# Patient Record
(demographics unavailable — no encounter records)

---

## 2024-11-26 NOTE — HISTORY OF PRESENT ILLNESS
[de-identified] : Ms. BRYAN JOVEL is a 70-year-old woman, referred by Dr. Dorian Zhang for consultation regarding a possible neoplasm, here for an initial visit.  Bryan presented to [facility] in 2024 w/ complaints of 1 week of progressively worsening abdominal pain, more severe to her RLQ pain. Subsequent CT showed multiple findings concerning for perforated appendicitis vs neoplastic process. She was ultimately discharged home on 2 weeks of Augmentin followed by a repeat CT to further assess.   CT A/P 2024 - small right & trace left pleural effusions, bibasilar subsegmental atelectasis  - irregular mural thickening & hyperenhancement involving the cecum & terminal ileum - suspicious for neoplasm, which measures approx. 5.5 x 3.8 cm - there is a complex multiloculated cystic structure or collection within the RLQ immediately adjacent to the cecum that measures 5.7 x 5.0 cm w/ components that appear somewhat tubular - possibly reflecting a dilated appendix, otherwise appendix is not clearly delineated - diffuse fat infiltration in the pelvis - wall thickening of the rectosigmoid colon, a segment of which is inseparable from the aforementioned complex cystic structure, colonic diverticulosis   labs 2024 - CEA, CA 19.9,  & LFTs all WNL  CT chest (non con) 2024 - no pulm nodules noted - small B/L pleural effusions, R > L   pelvic US 2024 - B/L ovarian cysts - mild thickening of the endometrial complex, which measures 8 mm - Right adnexal extraovarian mass measuring up to 5.3 cm corresponding w./ findings on recent CT - nonspecific but may represent perforated appendicitis   pelvic MRI 2024 - 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 Menopause 53.  ( births, ). Age of first full-term pregnancy 18. Denies OCP/HRT use. NO prior Cscope  CT A/P 2024 - unchanged sub cm hypodense hepatic lesions that are too small to characterize - few sub cm hypodense left renal lesions that are too small to characterize, Right kidney slightly rotated on its axis as on prior imaging  - B/L multiseptated cystic adnexal masses are again noted, measuring 3.7 x 2.4 cm on the Right and 5.1 x 2.8 cm on the Left - minimally smaller but not significantly changed respectively  - small duodenal diverticulum, large amt of stool throughout colon - irregular annular thickening & hyperenhancement of cecal bowel wall, most c/w a cecal mass - interval decrease in size of a multi loculated collection in the RLQ encompassing a thickened & abnormal appearing appendix  - a more superior component of the collection measures approx. 2.3 x 1.7 cm (previously 4.5 x 3.7 cm) and a more inferior component measures approx. 4.2 x 3.6 cm (previously 6.9 x 5.4 cm) - partially necrotic RLQ soft tissue/LAD w/ a referenced LN measuring 3.1 x 2.0 cm - an aortocaval LN measures 1.5 x 1.8 cm, unchanged, additional aortocaval LN 1.8 x 1.1 cm is unchanged, previously referenced Right mesenteric LN measuring 2.5 x 1.2 cm (previously 2.1 x 1.3 cm)  2024 - Bryan reports being in her usual state of health until earlier November when she started having generalized abdominal pain that progressively worsened, eventually focusing mainly in her RLQ. She called her PCP who directed her to urgent care, at  an abd US showed negative findings. Given the severity of her pain, she then reported to [facility] for further workup. She is almost done with a 14-day course of Augmentin (about 3 days left), no longer has any abdominal pain. She denies any nausea/vomiting, diarrhea, appetite & weight are unchanged. She is currently in between PCPs as her previous PCP (Dr. Davis @ [facility]) no longer does internal medicine, has not seen established with a new PCP yet.

## 2024-11-26 NOTE — HISTORY OF PRESENT ILLNESS
[de-identified] : Ms. BRYAN JOVEL is a 70-year-old woman, referred by Dr. Dorian Zhang for consultation regarding a possible neoplasm, here for an initial visit.  Bryan presented to [facility] in 2024 w/ complaints of 1 week of progressively worsening abdominal pain, more severe to her RLQ pain. Subsequent CT showed multiple findings concerning for perforated appendicitis vs neoplastic process. She was ultimately discharged home on 2 weeks of Augmentin followed by a repeat CT to further assess.   CT A/P 2024 - small right & trace left pleural effusions, bibasilar subsegmental atelectasis  - irregular mural thickening & hyperenhancement involving the cecum & terminal ileum - suspicious for neoplasm, which measures approx. 5.5 x 3.8 cm - there is a complex multiloculated cystic structure or collection within the RLQ immediately adjacent to the cecum that measures 5.7 x 5.0 cm w/ components that appear somewhat tubular - possibly reflecting a dilated appendix, otherwise appendix is not clearly delineated - diffuse fat infiltration in the pelvis - wall thickening of the rectosigmoid colon, a segment of which is inseparable from the aforementioned complex cystic structure, colonic diverticulosis   labs 2024 - CEA, CA 19.9,  & LFTs all WNL  CT chest (non con) 2024 - no pulm nodules noted - small B/L pleural effusions, R > L   pelvic US 2024 - B/L ovarian cysts - mild thickening of the endometrial complex, which measures 8 mm - Right adnexal extraovarian mass measuring up to 5.3 cm corresponding w./ findings on recent CT - nonspecific but may represent perforated appendicitis   pelvic MRI 2024 - 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 Menopause 53.  ( births, ). Age of first full-term pregnancy 18. Denies OCP/HRT use. NO prior Cscope  CT A/P 2024 - unchanged sub cm hypodense hepatic lesions that are too small to characterize - few sub cm hypodense left renal lesions that are too small to characterize, Right kidney slightly rotated on its axis as on prior imaging  - B/L multiseptated cystic adnexal masses are again noted, measuring 3.7 x 2.4 cm on the Right and 5.1 x 2.8 cm on the Left - minimally smaller but not significantly changed respectively  - small duodenal diverticulum, large amt of stool throughout colon - irregular annular thickening & hyperenhancement of cecal bowel wall, most c/w a cecal mass - interval decrease in size of a multi loculated collection in the RLQ encompassing a thickened & abnormal appearing appendix  - a more superior component of the collection measures approx. 2.3 x 1.7 cm (previously 4.5 x 3.7 cm) and a more inferior component measures approx. 4.2 x 3.6 cm (previously 6.9 x 5.4 cm) - partially necrotic RLQ soft tissue/LAD w/ a referenced LN measuring 3.1 x 2.0 cm - an aortocaval LN measures 1.5 x 1.8 cm, unchanged, additional aortocaval LN 1.8 x 1.1 cm is unchanged, previously referenced Right mesenteric LN measuring 2.5 x 1.2 cm (previously 2.1 x 1.3 cm)  2024 - Bryan reports being in her usual state of health until earlier November when she started having generalized abdominal pain that progressively worsened, eventually focusing mainly in her RLQ. She called her PCP who directed her to urgent care, at  an abd US showed negative findings. Given the severity of her pain, she then reported to [facility] for further workup. She is almost done with a 14-day course of Augmentin (about 3 days left), no longer has any abdominal pain. She denies any nausea/vomiting, diarrhea, appetite & weight are unchanged. She is currently in between PCPs as her previous PCP (Dr. Davis @ [facility]) no longer does internal medicine, has not seen established with a new PCP yet.

## 2024-11-26 NOTE — CONSULT LETTER
[Dear  ___] : Dear  [unfilled], [Consult Letter:] : I had the pleasure of evaluating your patient, [unfilled]. [Please see my note below.] : Please see my note below. [Consult Closing:] : Thank you very much for allowing me to participate in the care of this patient.  If you have any questions, please do not hesitate to contact me. [Sincerely,] : Sincerely, [FreeTextEntry2] : Jarred Davis MD [FreeTextEntry3] : Bull Montalvo MD Surgical Oncology [facility] Office: [phone] Cell: [phone]

## 2024-11-26 NOTE — ASSESSMENT
[FreeTextEntry1] : We discussed the perforated appendicitis and the concern for a cecal malignancy, and the adnexal mass.  The appendiceal abscess appears to have decreased in size over the past 2 weeks with antibiotics.  The cecal mass persists as does the adnexal lesions.  We discussed the need for ongoing antibiotics for another 2 weeks and repeat imaging including a CT colonography.  We will also arrange for GYN oncology follow-up and GI evaluation as well.  Ultimately, we discussed the need for surgical extirpation of the cecal mass pending the further workup.   All medical entries were at my, Dr. Bull Montalvo, direction. I have reviewed the chart and agree that the record accurately reflects my personal performance of the history, physical exam, assessment, and plan.  Our office nurse practitioner was present for the duration of the office visit.

## 2024-11-26 NOTE — CONSULT LETTER
[Dear  ___] : Dear  [unfilled], [Consult Letter:] : I had the pleasure of evaluating your patient, [unfilled]. [Please see my note below.] : Please see my note below. [Consult Closing:] : Thank you very much for allowing me to participate in the care of this patient.  If you have any questions, please do not hesitate to contact me. [Sincerely,] : Sincerely, [FreeTextEntry2] : Jarred Davis MD [FreeTextEntry3] : Bull Montalvo MD Surgical Oncology [facility] Office: [phone] Cell: [phone]

## 2024-12-06 NOTE — PROCEDURE
[Endometrial Biopsy] : an endometrial biopsy [Thickened Endometrium] : thickened endometrium [Patient] : the patient [Written consent] : written consent was obtained prior to the procedure and is detailed in the patient's record [Site Verification] : site verification performed. [Betadine] : betadine [Yes] : the specimen was sent to pathology [No Complications] : none [Tolerated Well] : the patient tolerated the procedure well [FreeTextEntry1] : minimal tissue with 2 passes of pipelle

## 2024-12-06 NOTE — HISTORY OF PRESENT ILLNESS
[FreeTextEntry1] : Ms. BRYAN JOVEL is a 70-year-old woman, referred by Dr. Montalvo for consultation regarding Ovarian cysts.  Of note, she has a known hx of endometriosis, s/p laparoscopy many years ago.    Bryan presented to ED at [facility] in November w/ complaints of 1 week of progressively worsening abdominal pain, more severe to her RLQ pain. Subsequent CT showed multiple findings concerning for perforated appendicitis vs neoplastic process. She was ultimately discharged home on 2 weeks of Augmentin followed by a repeat CT to further assess.  CT A/P 11/11/2024 - small right & trace left pleural effusions, bibasilar subsegmental atelectasis - irregular mural thickening & hyperenhancement involving the cecum & terminal ileum - suspicious for neoplasm, which measures approx. 5.5 x 3.8 cm - there is a complex multiloculated cystic structure or collection within the RLQ immediately adjacent to the cecum that measures 5.7 x 5.0 cm w/ components that appear somewhat tubular - possibly reflecting a dilated appendix, otherwise appendix is not clearly delineated - diffuse fat infiltration in the pelvis - wall thickening of the rectosigmoid colon, a segment of which is inseparable from the aforementioned complex cystic structure, colonic diverticulosis  labs 11/11/2024 - CEA, CA 19.9,  & LFTs all WNL - 33  CT chest (non con) 11/12/2024 - no pulm nodules noted - small B/L pleural effusions, R > L  pelvic US 11/12/2024 - B/L ovarian cysts - mild thickening of the endometrial complex, which measures 8 mm - Right adnexal extraovarian mass measuring up to 5.3 cm corresponding w./ findings on recent CT - nonspecific but may represent perforated appendicitis  pelvic MRI 11/13/2024 - redemonstrated 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 cm) w/ adjacent LAD - RLQ abscess (7.5 x 6.6 cm) likely due to perforated appendix, focal thickening of the sigmoid colon abutting the collection - multi cystic appearance of the ovaries  CT A/P 11/22/2024 - unchanged sub cm hypodense hepatic lesions that are too small to characterize - few sub cm hypodense left renal lesions that are too small to characterize, Right kidney slightly rotated on its axis as on prior imaging - B/L multiseptated cystic adnexal masses are again noted, measuring 3.7 x 2.4 cm on the Right and 5.1 x 2.8 cm on the Left - minimally smaller but not significantly changed respectively - small duodenal diverticulum, large amt of stool throughout colon - irregular annular thickening & hyperenhancement of cecal bowel wall, most c/w a cecal mass - interval decrease in size of a multi loculated collection in the RLQ encompassing a thickened & abnormal appearing appendix - a more superior component of the collection measures approx. 2.3 x 1.7 cm (previously 4.5 x 3.7 cm) and a more inferior component measures approx. 4.2 x 3.6 cm (previously 6.9 x 5.4 cm) - partially necrotic RLQ soft tissue/LAD w/ a referenced LN measuring 3.1 x 2.0 cm - an aortocaval LN measures 1.5 x 1.8 cm, unchanged, additional aortocaval LN 1.8 x 1.1 cm is unchanged, previously referenced Right mesenteric LN measuring 2.5 x 1.2 cm (previously 2.1 x 1.3 cm)  Plan for repeat CT scan on Monday 12/9/2024.  She has intermittent RLQ pain, Overall she is feeling better.  She denies dyspnea or chest pain, vaginal bleeding or discharge, nausea/vomiting, changes in bowel habits or urination, lower extremity edema or pain.  She is here to discuss further management.   HM- she has not seen a gynecologist, or had a colonoscopy in many years.

## 2024-12-06 NOTE — PAST MEDICAL HISTORY
[Postmenopausal] : The patient is postmenopausal [Menarche Age ____] : age at menarche was [unfilled] [Menopause Age____] : age at menopause was [unfilled] [Total Preg ___] : G[unfilled] [Live Births ___] : P[unfilled]  [Full Term ___] : Full Term: [unfilled] [Premature ___] : Premature: [unfilled] [Abortions ___] : Abortions:[unfilled] [Living ___] : Living: [unfilled]

## 2024-12-06 NOTE — OB HISTORY
[Total Preg ___] : : [unfilled] [Premature ___] : [unfilled] (premature) [Abortions ___] : [unfilled] (abortions) [Living ___] : [unfilled] (living) [Vaginal ___] : [unfilled] vaginal delivery(s) [Menarche Age ____] : age at menarche was [unfilled] [Menopause  Age ____] : menopause occurred at age [unfilled]

## 2024-12-06 NOTE — PHYSICAL EXAM
[Chaperone Present] : A chaperone was present in the examining room during all aspects of the physical examination [67991] : A chaperone was present during the pelvic exam. [Normal] : Anus and perineum: Normal sphincter tone, no masses, no prolapse.

## 2024-12-06 NOTE — DISCUSSION/SUMMARY
[FreeTextEntry1] : 71 yo with bilateral adnexal cysts, normal  and thickened endometrium  I discussed my recommendations with Ms. Novak in detail.    The management of bilateral adnexal cysts in postmenopausal state was discussed.  Ovarian malignancy can only be diagnosed definitively after excision.  Based on normal CA-125 and radiographic appearance of cysts, the cysts are likely benign.  Options for management include continued surveillance vs consideration of surgical excision.  Risks/benefits of each option discussed in detail.  As she is planned to have surgery for ruptured appendicitis, I would recommend BSO at time of surgery.  I also discussed the management of asymptomatic thickened endometrium.  Endometrial biopsy was performed today with scant tissue.  Pending results, plan is for continued surveillance vs consideration of hysterectomy at time of surgery.  Risks/benefits of each option in this situation also discussed.  I will contact patient with results of biopsy when available.  She is scheduled for f/u CT next week.  Further management will be coordinated with Dr. Montalvo.

## 2024-12-23 NOTE — HISTORY OF PRESENT ILLNESS
[de-identified] : Ms. BRYAN JOVEL is a 70-year-old woman, referred by Dr. Dorian Zhang for consultation regarding a possible neoplasm, here for a follow-up visit.   Bryan presented to [facility] in 2024 w/ complaints of 1 week of progressively worsening abdominal pain, more severe to her RLQ pain. Subsequent CT showed multiple findings concerning for perforated appendicitis vs neoplastic process. She was ultimately discharged home on 2 weeks of Augmentin followed by a repeat CT to further assess.   CT A/P 2024 - small right & trace left pleural effusions, bibasilar subsegmental atelectasis  - irregular mural thickening & hyperenhancement involving the cecum & terminal ileum - suspicious for neoplasm, which measures approx. 5.5 x 3.8 cm - there is a complex multiloculated cystic structure or collection within the RLQ immediately adjacent to the cecum that measures 5.7 x 5.0 cm w/ components that appear somewhat tubular - possibly reflecting a dilated appendix, otherwise appendix is not clearly delineated - diffuse fat infiltration in the pelvis - wall thickening of the rectosigmoid colon, a segment of which is inseparable from the aforementioned complex cystic structure, colonic diverticulosis   labs 2024 - CEA, CA 19.9,  & LFTs all WNL  CT chest (non con) 2024 - no pulm nodules noted - small B/L pleural effusions, R > L   pelvic US 2024 - B/L ovarian cysts - mild thickening of the endometrial complex, which measures 8 mm - Right adnexal extraovarian mass measuring up to 5.3 cm corresponding w./ findings on recent CT - nonspecific but may represent perforated appendicitis   pelvic MRI 2024 - 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 Menopause 53.  ( births, ). Age of first full-term pregnancy 18. Denies OCP/HRT use. NO prior Cscope  CT A/P 2024 - unchanged sub cm hypodense hepatic lesions that are too small to characterize - few sub cm hypodense left renal lesions that are too small to characterize, Right kidney slightly rotated on its axis as on prior imaging  - B/L multiseptated cystic adnexal masses are again noted, measuring 3.7 x 2.4 cm on the Right and 5.1 x 2.8 cm on the Left - minimally smaller but not significantly changed respectively  - small duodenal diverticulum, large amt of stool throughout colon - irregular annular thickening & hyperenhancement of cecal bowel wall, most c/w a cecal mass - interval decrease in size of a multi loculated collection in the RLQ encompassing a thickened & abnormal appearing appendix  - a more superior component of the collection measures approx. 2.3 x 1.7 cm (previously 4.5 x 3.7 cm) and a more inferior component measures approx. 4.2 x 3.6 cm (previously 6.9 x 5.4 cm) - partially necrotic RLQ soft tissue/LAD w/ a referenced LN measuring 3.1 x 2.0 cm - an aortocaval LN measures 1.5 x 1.8 cm, unchanged, additional aortocaval LN 1.8 x 1.1 cm is unchanged, previously referenced Right mesenteric LN measuring 2.5 x 1.2 cm (previously 2.1 x 1.3 cm)  2024 - Bryan reports being in her usual state of health until earlier November when she started having generalized abdominal pain that progressively worsened, eventually focusing mainly in her RLQ. She called her PCP who directed her to urgent care, at  an abd US showed negative findings. Given the severity of her pain, she then reported to [facility] for further workup. She is almost done with a 14-day course of Augmentin (about 3 days left), no longer has any abdominal pain. She denies any nausea/vomiting, diarrhea, appetite & weight are unchanged. She is currently in between PCPs as her previous PCP (Dr. Davis @ [facility]) no longer does internal medicine, has not seen established with a new PCP yet.  We discussed the perforated appendicitis and the concern for a cecal malignancy, and the adnexal mass.  The appendiceal abscess appears to have decreased in size over the past 2 weeks with antibiotics.  The cecal mass persists as does the adnexal lesions.  We discussed the need for ongoing antibiotics for another 2 weeks and repeat imaging including a CT colonography.  We will also arrange for GYN oncology follow-up and GI evaluation as well.  Ultimately, we discussed the need for surgical extirpation of the cecal mass pending the further workup.  CT enterography 2024 - 4.8 cm septated Left adnexal cystic mass & 3.5 cm Right adnexal cystic mass are unchanged, endometrium is mildly fluid distended vs thickened - masslike annular thickening of the cecum w/ avid enhancement is similar to prior study, colonic diverticulosis. appendix remains thickened & abnormal  * resolution of previously noted pelvic fluid collections - RP nodes, some now necrotic. referenced necrotic aortocaval node measures 2.3 x 1.5 cm (previously 1.8 x 1.1 cm) adjacent nonnecrotic node measures 1.6 x 1.1 cm, w/o significant change when remeasured on prior study, RLQ mesenteric heterogenous mass/adenopathy measures 2.7 x 1.9 cm (previously 3.1 x 2.0 cm)  2024 - Bryan is here for a follow-up visit after her recent imaging to discuss next steps. She consulted w/ Dr. Ilia Llanes last week (GI @ [facility]) and he is planning for a baseline preoperative Cscope. She has finished her course of ABT yesterday. She consulted w/ Dr. Yamile Modi from [facility] who does recommend a concomitant BSO at the time of our surgery - Bryan defers. She otherwise is feeling better, noted some right intermittent right flank pain that is mild in nature.

## 2024-12-23 NOTE — CONSULT LETTER
[Dear  ___] : Dear  [unfilled], [Consult Letter:] : I had the pleasure of evaluating your patient, [unfilled]. [Please see my note below.] : Please see my note below. [Consult Closing:] : Thank you very much for allowing me to participate in the care of this patient.  If you have any questions, please do not hesitate to contact me. [Sincerely,] : Sincerely, [DrRasheeda  ___] : Dr. MARTINEZ [DrRasheeda ___] : Dr. MARTINEZ [FreeTextEntry2] : Jarred Davis MD [FreeTextEntry3] : Bull Montalvo MD Surgical Oncology [facility] Office: [phone] Cell: [phone]  Advancement Flap (Single) Text: The defect edges were debeveled with a #15 scalpel blade.  Given the location of the defect and the proximity to free margins a single advancement flap was deemed most appropriate.  Using a sterile surgical marker, an appropriate advancement flap was drawn incorporating the defect and placing the expected incisions within the relaxed skin tension lines where possible.    The area thus outlined was incised deep to adipose tissue with a #15 scalpel blade.  The skin margins were undermined to an appropriate distance in all directions utilizing iris scissors.

## 2024-12-23 NOTE — HISTORY OF PRESENT ILLNESS
[de-identified] : Ms. BRYAN JOVEL is a 70-year-old woman, referred by Dr. Dorian Zhang for consultation regarding a possible neoplasm, here for a follow-up visit.   Bryan presented to [facility] in 2024 w/ complaints of 1 week of progressively worsening abdominal pain, more severe to her RLQ pain. Subsequent CT showed multiple findings concerning for perforated appendicitis vs neoplastic process. She was ultimately discharged home on 2 weeks of Augmentin followed by a repeat CT to further assess.   CT A/P 2024 - small right & trace left pleural effusions, bibasilar subsegmental atelectasis  - irregular mural thickening & hyperenhancement involving the cecum & terminal ileum - suspicious for neoplasm, which measures approx. 5.5 x 3.8 cm - there is a complex multiloculated cystic structure or collection within the RLQ immediately adjacent to the cecum that measures 5.7 x 5.0 cm w/ components that appear somewhat tubular - possibly reflecting a dilated appendix, otherwise appendix is not clearly delineated - diffuse fat infiltration in the pelvis - wall thickening of the rectosigmoid colon, a segment of which is inseparable from the aforementioned complex cystic structure, colonic diverticulosis   labs 2024 - CEA, CA 19.9,  & LFTs all WNL  CT chest (non con) 2024 - no pulm nodules noted - small B/L pleural effusions, R > L   pelvic US 2024 - B/L ovarian cysts - mild thickening of the endometrial complex, which measures 8 mm - Right adnexal extraovarian mass measuring up to 5.3 cm corresponding w./ findings on recent CT - nonspecific but may represent perforated appendicitis   pelvic MRI 2024 - redemonstrated 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 cm) w/ adjacent LAD - RLQ abscess (7.5 x 6.6 cm) likely due to perforated appendix, focal thickening of the sigmoid colon abutting the collection - multi cystic appearance of the ovaries   PMH: denies    PSH: laparoscopic sx  for endometriosis  Meds:  denies ALL:  NKDA, seasonal  SH:  denies tobacco use, social ETOH, lives at home w/ her  (Víctor), 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 Menopause 53.  ( births, ). Age of first full-term pregnancy 18. Denies OCP/HRT use. NO prior Cscope  CT A/P 2024 - unchanged sub cm hypodense hepatic lesions that are too small to characterize - few sub cm hypodense left renal lesions that are too small to characterize, Right kidney slightly rotated on its axis as on prior imaging  - B/L multiseptated cystic adnexal masses are again noted, measuring 3.7 x 2.4 cm on the Right and 5.1 x 2.8 cm on the Left - minimally smaller but not significantly changed respectively  - small duodenal diverticulum, large amt of stool throughout colon - irregular annular thickening & hyperenhancement of cecal bowel wall, most c/w a cecal mass - interval decrease in size of a multi loculated collection in the RLQ encompassing a thickened & abnormal appearing appendix  - a more superior component of the collection measures approx. 2.3 x 1.7 cm (previously 4.5 x 3.7 cm) and a more inferior component measures approx. 4.2 x 3.6 cm (previously 6.9 x 5.4 cm) - partially necrotic RLQ soft tissue/LAD w/ a referenced LN measuring 3.1 x 2.0 cm - an aortocaval LN measures 1.5 x 1.8 cm, unchanged, additional aortocaval LN 1.8 x 1.1 cm is unchanged, previously referenced Right mesenteric LN measuring 2.5 x 1.2 cm (previously 2.1 x 1.3 cm)  2024 - Bryan reports being in her usual state of health until earlier November when she started having generalized abdominal pain that progressively worsened, eventually focusing mainly in her RLQ. She called her PCP who directed her to urgent care, at  an abd US showed negative findings. Given the severity of her pain, she then reported to [facility] for further workup. She is almost done with a 14-day course of Augmentin (about 3 days left), no longer has any abdominal pain. She denies any nausea/vomiting, diarrhea, appetite & weight are unchanged. She is currently in between PCPs as her previous PCP (Dr. Davis @ [facility]) no longer does internal medicine, has not seen established with a new PCP yet.  We discussed the perforated appendicitis and the concern for a cecal malignancy, and the adnexal mass.  The appendiceal abscess appears to have decreased in size over the past 2 weeks with antibiotics.  The cecal mass persists as does the adnexal lesions.  We discussed the need for ongoing antibiotics for another 2 weeks and repeat imaging including a CT colonography.  We will also arrange for GYN oncology follow-up and GI evaluation as well.  Ultimately, we discussed the need for surgical extirpation of the cecal mass pending the further workup.  CT enterography 2024 - 4.8 cm septated Left adnexal cystic mass & 3.5 cm Right adnexal cystic mass are unchanged, endometrium is mildly fluid distended vs thickened - masslike annular thickening of the cecum w/ avid enhancement is similar to prior study, colonic diverticulosis. appendix remains thickened & abnormal  * resolution of previously noted pelvic fluid collections - RP nodes, some now necrotic. referenced necrotic aortocaval node measures 2.3 x 1.5 cm (previously 1.8 x 1.1 cm) adjacent nonnecrotic node measures 1.6 x 1.1 cm, w/o significant change when remeasured on prior study, RLQ mesenteric heterogenous mass/adenopathy measures 2.7 x 1.9 cm (previously 3.1 x 2.0 cm)  2024 - Bryan is here for a follow-up visit after her recent imaging to discuss next steps. She consulted w/ Dr. Ilia Llanes last week (GI @ [facility]) and he is planning for a baseline preoperative Cscope. She has finished her course of ABT yesterday. She consulted w/ Dr. Yamile Modi from [facility] who does recommend a concomitant BSO at the time of our surgery - Bryan defers. She otherwise is feeling better, noted some right intermittent right flank pain that is mild in nature.

## 2024-12-23 NOTE — ASSESSMENT
[FreeTextEntry1] :    All medical entries were at my, Dr. Bull Montalvo, direction. I have reviewed the chart and agree that the record accurately reflects my personal performance of the history, physical exam, assessment, and plan.  Our office nurse practitioner was present for the duration of the office visit.

## 2024-12-23 NOTE — CONSULT LETTER
[Dear  ___] : Dear  [unfilled], [Consult Letter:] : I had the pleasure of evaluating your patient, [unfilled]. [Please see my note below.] : Please see my note below. [Consult Closing:] : Thank you very much for allowing me to participate in the care of this patient.  If you have any questions, please do not hesitate to contact me. [Sincerely,] : Sincerely, [DrRasheeda  ___] : Dr. MARTINEZ [DrRasheeda ___] : Dr. MARTINEZ [FreeTextEntry2] : Jarred Davis MD [FreeTextEntry3] : Bull Montalvo MD Surgical Oncology [facility] Office: [phone] Cell: [phone]

## 2025-02-28 NOTE — CONSULT LETTER
[Dear  ___] : Dear  [unfilled], [Consult Letter:] : I had the pleasure of evaluating your patient, [unfilled]. [Please see my note below.] : Please see my note below. [Consult Closing:] : Thank you very much for allowing me to participate in the care of this patient.  If you have any questions, please do not hesitate to contact me. [Sincerely,] : Sincerely, [DrRasheeda  ___] : Dr. MARTINEZ [DrRasheeda ___] : Dr. MARTINEZ [FreeTextEntry2] : Jarred Davis MD [FreeTextEntry3] : Bull Montalvo MD Surgical Oncology [facility] Office: [phone] Cell: [phone]

## 2025-02-28 NOTE — PHYSICAL EXAM
[Normal] : supple, no neck mass and thyroid not enlarged [Normal Neck Lymph Nodes] : normal neck lymph nodes  [Normal Supraclavicular Lymph Nodes] : normal supraclavicular lymph nodes [Normal Groin Lymph Nodes] : normal groin lymph nodes [Normal Axillary Lymph Nodes] : normal axillary lymph nodes [Normal] : oriented to person, place and time, with appropriate affect [de-identified] : midline incision healing well

## 2025-02-28 NOTE — HISTORY OF PRESENT ILLNESS
[de-identified] : Ms. BRYAN JOVEL is a 70-year-old woman, referred by Dr. 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   Bryan presented to [facility] in 2024 w/ complaints of 1 week of progressively worsening abdominal pain, more severe to her RLQ pain. Subsequent CT showed multiple findings concerning for perforated appendicitis vs neoplastic process. She was ultimately discharged home on 2 weeks of Augmentin followed by a repeat CT to further assess.   CT A/P 2024 - small right & trace left pleural effusions, bibasilar subsegmental atelectasis  - irregular mural thickening & hyperenhancement involving the cecum & terminal ileum - suspicious for neoplasm, which measures approx. 5.5 x 3.8 cm - there is a complex multiloculated cystic structure or collection within the RLQ immediately adjacent to the cecum that measures 5.7 x 5.0 cm w/ components that appear somewhat tubular - possibly reflecting a dilated appendix, otherwise appendix is not clearly delineated - diffuse fat infiltration in the pelvis - wall thickening of the rectosigmoid colon, a segment of which is inseparable from the aforementioned complex cystic structure, colonic diverticulosis   labs 2024 - CEA, CA 19.9,  & LFTs all WNL  CT chest (non con) 2024 - no pulm nodules noted - small B/L pleural effusions, R > L   pelvic US 2024 - B/L ovarian cysts - mild thickening of the endometrial complex, which measures 8 mm - Right adnexal extraovarian mass measuring up to 5.3 cm corresponding w./ findings on recent CT - nonspecific but may represent perforated appendicitis   pelvic MRI 2024 - redemonstrated 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 cm) w/ adjacent LAD - RLQ abscess (7.5 x 6.6 cm) likely due to perforated appendix, focal thickening of the sigmoid colon abutting the collection - multi cystic appearance of the ovaries   PMH: denies    PSH: laparoscopic sx  for endometriosis  Meds:  denies ALL:  NKDA, seasonal  SH:  denies tobacco use, social ETOH, lives at home w/ her  (Víctor), 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 Menopause 53.  ( births, ). Age of first full-term pregnancy 18. Denies OCP/HRT use. NO prior Cscope  CT A/P 2024 - unchanged sub cm hypodense hepatic lesions that are too small to characterize - few sub cm hypodense left renal lesions that are too small to characterize, Right kidney slightly rotated on its axis as on prior imaging  - B/L multiseptated cystic adnexal masses are again noted, measuring 3.7 x 2.4 cm on the Right and 5.1 x 2.8 cm on the Left - minimally smaller but not significantly changed respectively  - small duodenal diverticulum, large amt of stool throughout colon - irregular annular thickening & hyperenhancement of cecal bowel wall, most c/w a cecal mass - interval decrease in size of a multi loculated collection in the RLQ encompassing a thickened & abnormal appearing appendix  - a more superior component of the collection measures approx. 2.3 x 1.7 cm (previously 4.5 x 3.7 cm) and a more inferior component measures approx. 4.2 x 3.6 cm (previously 6.9 x 5.4 cm) - partially necrotic RLQ soft tissue/LAD w/ a referenced LN measuring 3.1 x 2.0 cm - an aortocaval LN measures 1.5 x 1.8 cm, unchanged, additional aortocaval LN 1.8 x 1.1 cm is unchanged, previously referenced Right mesenteric LN measuring 2.5 x 1.2 cm (previously 2.1 x 1.3 cm)  2024 - Bryan reports being in her usual state of health until earlier November when she started having generalized abdominal pain that progressively worsened, eventually focusing mainly in her RLQ. She called her PCP who directed her to urgent care, at  an abd US showed negative findings. Given the severity of her pain, she then reported to [facility] for further workup. She is almost done with a 14-day course of Augmentin (about 3 days left), no longer has any abdominal pain. She denies any nausea/vomiting, diarrhea, appetite & weight are unchanged. She is currently in between PCPs as her previous PCP (Dr. Davis @ [facility] no longer does internal medicine, has not seen established with a new PCP yet.  We discussed the perforated appendicitis and the concern for a cecal malignancy, and the adnexal mass.  The appendiceal abscess appears to have decreased in size over the past 2 weeks with antibiotics.  The cecal mass persists as does the adnexal lesions.  We discussed the need for ongoing antibiotics for another 2 weeks and repeat imaging including a CT colonography.  We will also arrange for GYN oncology follow-up and GI evaluation as well.  Ultimately, we discussed the need for surgical extirpation of the cecal mass pending the further workup.  CT enterography 2024 - 4.8 cm septated Left adnexal cystic mass & 3.5 cm Right adnexal cystic mass are unchanged, endometrium is mildly fluid distended vs thickened - masslike annular thickening of the cecum w/ avid enhancement is similar to prior study, colonic diverticulosis. appendix remains thickened & abnormal  * resolution of previously noted pelvic fluid collections - RP nodes, some now necrotic. referenced necrotic aortocaval node measures 2.3 x 1.5 cm (previously 1.8 x 1.1 cm) adjacent nonnecrotic node measures 1.6 x 1.1 cm, w/o significant change when remeasured on prior study, RLQ mesenteric heterogenous mass/adenopathy measures 2.7 x 1.9 cm (previously 3.1 x 2.0 cm)  2024 - Bryan is here for a follow-up visit after her recent imaging to discuss next steps. She consulted w/ Dr. Ilia Llanes last week (GI @ [facility]) and he is planning for a baseline preoperative Cscope. She has finished her course of ABT yesterday. She consulted w/ Dr. Yamile Modi from [facility] who does recommend a concomitant BSO at the time of our surgery - Bryan defers. She otherwise is feeling better, noted some right intermittent right flank pain that is mild in nature.  We discussed the plan for a robotic possible open right colectomy including the jazzy-appendiceal abscess. We discussed the risks, benefits and alternatives of the procedure.  We also discussed post operative expectations and possible complications.  Bryan expresses understanding and agrees to proceed.  Bryan will undergo a preoperative colonoscopy too assess the cecal mass.  We also discussed consideration of bilateral salpingo-oophorectomy with GYN oncology. Bryan at this time has not decided if she is willing to proceed with the BSO.  CT A/P 2025 - mass-like annular thickening of the cecum (6.2 x 4.7 cm) w/ avid enhancement, similar to prior study. no bowel obstruction, appendix remains thickened & abnormal - some of the previously noted necrotic RP LN are now more solid in appearance. nevertheless, additional necrotic LN are seen as well, size is similar to prior exam (RLQ mass/adenopathy measures 2.8 x 2 cm) - unchanged B/L adnexal cysts   colonoscopy 2025 (Dr. Ilia Llanes @ [facility]) - likely malignant partially obstruction tumor in cecum - bx - one 5-10 mm polyp in sigmoid, removed - moderate diverticulosis in sigmoid & descending colon **bx of cecum mass showed invasive poorly differentiated adenocarcinoma, no LVI, MMR-P (c/w colon primary)  Surgery was initially delayed r/t insurance issues, then scheduled for  - however, Bryan presented to [facility] on 2/10 w/ worsening abdominal pain. In the ED, she also sustained a fall r/t a vasovagal episode, +HS/LOC.  Imaging showed unchanged cecal mass & adjacent likely metastatic nodes w/ new mild small bowel dilatation to level or cecum - c/f developing obstruction.  **SURGERY** Bryan is now s/p a robotic to open Right hemicolectomy w/ SMA LND & concurrent BSO (Dr. Yamile Modi) on 2025, path: - invasive poorly differentiated adenocarcinoma of cecum, 8.5 cm, small vessel, large vessel & extramural LVI, PNI present, tumor invading serosa extending to mesentery vascular pedicle margin,  LN positive for mets (7 from superior mesenteric), pT4a N2b * small intestine & colon w/ anastomosis & no significant histopathologic findings  **GYN path still pending for BSO   2025 - Bryan is here for an initial post-op visit. She is feeling well overall, ambulating and c/o minimal pain. She is eating well and eager to liberalize her diet.

## 2025-02-28 NOTE — HISTORY OF PRESENT ILLNESS
[de-identified] : Ms. BRYAN JOVEL is a 70-year-old woman, referred by Dr. 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   Bryan presented to [facility] in 2024 w/ complaints of 1 week of progressively worsening abdominal pain, more severe to her RLQ pain. Subsequent CT showed multiple findings concerning for perforated appendicitis vs neoplastic process. She was ultimately discharged home on 2 weeks of Augmentin followed by a repeat CT to further assess.   CT A/P 2024 - small right & trace left pleural effusions, bibasilar subsegmental atelectasis  - irregular mural thickening & hyperenhancement involving the cecum & terminal ileum - suspicious for neoplasm, which measures approx. 5.5 x 3.8 cm - there is a complex multiloculated cystic structure or collection within the RLQ immediately adjacent to the cecum that measures 5.7 x 5.0 cm w/ components that appear somewhat tubular - possibly reflecting a dilated appendix, otherwise appendix is not clearly delineated - diffuse fat infiltration in the pelvis - wall thickening of the rectosigmoid colon, a segment of which is inseparable from the aforementioned complex cystic structure, colonic diverticulosis   labs 2024 - CEA, CA 19.9,  & LFTs all WNL  CT chest (non con) 2024 - no pulm nodules noted - small B/L pleural effusions, R > L   pelvic US 2024 - B/L ovarian cysts - mild thickening of the endometrial complex, which measures 8 mm - Right adnexal extraovarian mass measuring up to 5.3 cm corresponding w./ findings on recent CT - nonspecific but may represent perforated appendicitis   pelvic MRI 2024 - redemonstrated 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 cm) w/ adjacent LAD - RLQ abscess (7.5 x 6.6 cm) likely due to perforated appendix, focal thickening of the sigmoid colon abutting the collection - multi cystic appearance of the ovaries   PMH: denies    PSH: laparoscopic sx  for endometriosis  Meds:  denies ALL:  NKDA, seasonal  SH:  denies tobacco use, social ETOH, lives at home w/ her  (Víctor), 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 Menopause 53.  ( births, ). Age of first full-term pregnancy 18. Denies OCP/HRT use. NO prior Cscope  CT A/P 2024 - unchanged sub cm hypodense hepatic lesions that are too small to characterize - few sub cm hypodense left renal lesions that are too small to characterize, Right kidney slightly rotated on its axis as on prior imaging  - B/L multiseptated cystic adnexal masses are again noted, measuring 3.7 x 2.4 cm on the Right and 5.1 x 2.8 cm on the Left - minimally smaller but not significantly changed respectively  - small duodenal diverticulum, large amt of stool throughout colon - irregular annular thickening & hyperenhancement of cecal bowel wall, most c/w a cecal mass - interval decrease in size of a multi loculated collection in the RLQ encompassing a thickened & abnormal appearing appendix  - a more superior component of the collection measures approx. 2.3 x 1.7 cm (previously 4.5 x 3.7 cm) and a more inferior component measures approx. 4.2 x 3.6 cm (previously 6.9 x 5.4 cm) - partially necrotic RLQ soft tissue/LAD w/ a referenced LN measuring 3.1 x 2.0 cm - an aortocaval LN measures 1.5 x 1.8 cm, unchanged, additional aortocaval LN 1.8 x 1.1 cm is unchanged, previously referenced Right mesenteric LN measuring 2.5 x 1.2 cm (previously 2.1 x 1.3 cm)  2024 - Bryan reports being in her usual state of health until earlier November when she started having generalized abdominal pain that progressively worsened, eventually focusing mainly in her RLQ. She called her PCP who directed her to urgent care, at  an abd US showed negative findings. Given the severity of her pain, she then reported to [facility] for further workup. She is almost done with a 14-day course of Augmentin (about 3 days left), no longer has any abdominal pain. She denies any nausea/vomiting, diarrhea, appetite & weight are unchanged. She is currently in between PCPs as her previous PCP (Dr. Davis @ [facility]) no longer does internal medicine, has not seen established with a new PCP yet.  We discussed the perforated appendicitis and the concern for a cecal malignancy, and the adnexal mass.  The appendiceal abscess appears to have decreased in size over the past 2 weeks with antibiotics.  The cecal mass persists as does the adnexal lesions.  We discussed the need for ongoing antibiotics for another 2 weeks and repeat imaging including a CT colonography.  We will also arrange for GYN oncology follow-up and GI evaluation as well.  Ultimately, we discussed the need for surgical extirpation of the cecal mass pending the further workup.  CT enterography 2024 - 4.8 cm septated Left adnexal cystic mass & 3.5 cm Right adnexal cystic mass are unchanged, endometrium is mildly fluid distended vs thickened - masslike annular thickening of the cecum w/ avid enhancement is similar to prior study, colonic diverticulosis. appendix remains thickened & abnormal  * resolution of previously noted pelvic fluid collections - RP nodes, some now necrotic. referenced necrotic aortocaval node measures 2.3 x 1.5 cm (previously 1.8 x 1.1 cm) adjacent nonnecrotic node measures 1.6 x 1.1 cm, w/o significant change when remeasured on prior study, RLQ mesenteric heterogenous mass/adenopathy measures 2.7 x 1.9 cm (previously 3.1 x 2.0 cm)  2024 - Bryan is here for a follow-up visit after her recent imaging to discuss next steps. She consulted w/ Dr. Ilia Llanes last week (GI @ [facility]) and he is planning for a baseline preoperative Cscope. She has finished her course of ABT yesterday. She consulted w/ Dr. Yamile Modi from [facility] who does recommend a concomitant BSO at the time of our surgery - Bryan defers. She otherwise is feeling better, noted some right intermittent right flank pain that is mild in nature.  We discussed the plan for a robotic possible open right colectomy including the jazzy-appendiceal abscess. We discussed the risks, benefits and alternatives of the procedure.  We also discussed post operative expectations and possible complications.  Bryan expresses understanding and agrees to proceed.  Bryan will undergo a preoperative colonoscopy too assess the cecal mass.  We also discussed consideration of bilateral salpingo-oophorectomy with GYN oncology. Bryan at this time has not decided if she is willing to proceed with the BSO.  CT A/P 2025 - mass-like annular thickening of the cecum (6.2 x 4.7 cm) w/ avid enhancement, similar to prior study. no bowel obstruction, appendix remains thickened & abnormal - some of the previously noted necrotic RP LN are now more solid in appearance. nevertheless, additional necrotic LN are seen as well, size is similar to prior exam (RLQ mass/adenopathy measures 2.8 x 2 cm) - unchanged B/L adnexal cysts   colonoscopy 2025 (Dr. Ilia Llanes @ [facility]) - likely malignant partially obstruction tumor in cecum - bx - one 5-10 mm polyp in sigmoid, removed - moderate diverticulosis in sigmoid & descending colon **bx of cecum mass showed invasive poorly differentiated adenocarcinoma, no LVI, MMR-P (c/w colon primary)  Surgery was initially delayed r/t insurance issues, then scheduled for  - however, Bryan presented to [facility] on 2/10 w/ worsening abdominal pain. In the ED, she also sustained a fall r/t a vasovagal episode, +HS/LOC.  Imaging showed unchanged cecal mass & adjacent likely metastatic nodes w/ new mild small bowel dilatation to level or cecum - c/f developing obstruction.  **SURGERY** Bryan is now s/p a robotic to open Right hemicolectomy w/ SMA LND & concurrent BSO (Dr. Yamile Modi) on 2025, path: - invasive poorly differentiated adenocarcinoma of cecum, 8.5 cm, small vessel, large vessel & extramural LVI, PNI present, tumor invading serosa extending to mesentery vascular pedicle margin,  LN positive for mets (7 from superior mesenteric), pT4a N2b * small intestine & colon w/ anastomosis & no significant histopathologic findings  **GYN path still pending for BSO   2025 - Bryan is here for an initial post-op visit. She is feeling well overall, ambulating and c/o minimal pain. She is eating well and eager to liberalize her diet.

## 2025-02-28 NOTE — ASSESSMENT
[FreeTextEntry1] :  We discussed the need for medical oncology evaluation.  Jessica will liberalize her diet and slowly return to baseline activities.    All medical entries were at my, Dr. Bull Montalvo, direction. I have reviewed the chart and agree that the record accurately reflects my personal performance of the history, physical exam, assessment, and plan.  Our office nurse practitioner was present for the duration of the office visit.

## 2025-02-28 NOTE — CONSULT LETTER
[Dear  ___] : Dear  [unfilled], [Consult Letter:] : I had the pleasure of evaluating your patient, [unfilled]. [Please see my note below.] : Please see my note below. [Consult Closing:] : Thank you very much for allowing me to participate in the care of this patient.  If you have any questions, please do not hesitate to contact me. [Sincerely,] : Sincerely, [DrRasheeda  ___] : Dr. MARTINEZ [DrRasheeda ___] : Dr. MARTINEZ [FreeTextEntry2] : Jarred Davis MD [FreeTextEntry3] : Bull Montalvo MD Surgical Oncology [facility] Office: [phone] Cell: [phone]

## 2025-02-28 NOTE — PHYSICAL EXAM
[Normal] : supple, no neck mass and thyroid not enlarged [Normal Neck Lymph Nodes] : normal neck lymph nodes  [Normal Supraclavicular Lymph Nodes] : normal supraclavicular lymph nodes [Normal Groin Lymph Nodes] : normal groin lymph nodes [Normal Axillary Lymph Nodes] : normal axillary lymph nodes [Normal] : oriented to person, place and time, with appropriate affect [de-identified] : midline incision healing well

## 2025-02-28 NOTE — REASON FOR VISIT
[Post-Op] : a post-op for [Colon Cancer] : colon cancer [FreeTextEntry2] : cecal mass & perforated appendicitis

## 2025-02-28 NOTE — HISTORY OF PRESENT ILLNESS
[de-identified] : Ms. BRYAN JOVEL is a 70-year-old woman, referred by Dr. 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   Bryan presented to [facility] in 2024 w/ complaints of 1 week of progressively worsening abdominal pain, more severe to her RLQ pain. Subsequent CT showed multiple findings concerning for perforated appendicitis vs neoplastic process. She was ultimately discharged home on 2 weeks of Augmentin followed by a repeat CT to further assess.   CT A/P 2024 - small right & trace left pleural effusions, bibasilar subsegmental atelectasis  - irregular mural thickening & hyperenhancement involving the cecum & terminal ileum - suspicious for neoplasm, which measures approx. 5.5 x 3.8 cm - there is a complex multiloculated cystic structure or collection within the RLQ immediately adjacent to the cecum that measures 5.7 x 5.0 cm w/ components that appear somewhat tubular - possibly reflecting a dilated appendix, otherwise appendix is not clearly delineated - diffuse fat infiltration in the pelvis - wall thickening of the rectosigmoid colon, a segment of which is inseparable from the aforementioned complex cystic structure, colonic diverticulosis   labs 2024 - CEA, CA 19.9,  & LFTs all WNL  CT chest (non con) 2024 - no pulm nodules noted - small B/L pleural effusions, R > L   pelvic US 2024 - B/L ovarian cysts - mild thickening of the endometrial complex, which measures 8 mm - Right adnexal extraovarian mass measuring up to 5.3 cm corresponding w./ findings on recent CT - nonspecific but may represent perforated appendicitis   pelvic MRI 2024 - redemonstrated 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 cm) w/ adjacent LAD - RLQ abscess (7.5 x 6.6 cm) likely due to perforated appendix, focal thickening of the sigmoid colon abutting the collection - multi cystic appearance of the ovaries   PMH: denies    PSH: laparoscopic sx  for endometriosis  Meds:  denies ALL:  NKDA, seasonal  SH:  denies tobacco use, social ETOH, lives at home w/ her  (Víctor), 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 Menopause 53.  ( births, ). Age of first full-term pregnancy 18. Denies OCP/HRT use. NO prior Cscope  CT A/P 2024 - unchanged sub cm hypodense hepatic lesions that are too small to characterize - few sub cm hypodense left renal lesions that are too small to characterize, Right kidney slightly rotated on its axis as on prior imaging  - B/L multiseptated cystic adnexal masses are again noted, measuring 3.7 x 2.4 cm on the Right and 5.1 x 2.8 cm on the Left - minimally smaller but not significantly changed respectively  - small duodenal diverticulum, large amt of stool throughout colon - irregular annular thickening & hyperenhancement of cecal bowel wall, most c/w a cecal mass - interval decrease in size of a multi loculated collection in the RLQ encompassing a thickened & abnormal appearing appendix  - a more superior component of the collection measures approx. 2.3 x 1.7 cm (previously 4.5 x 3.7 cm) and a more inferior component measures approx. 4.2 x 3.6 cm (previously 6.9 x 5.4 cm) - partially necrotic RLQ soft tissue/LAD w/ a referenced LN measuring 3.1 x 2.0 cm - an aortocaval LN measures 1.5 x 1.8 cm, unchanged, additional aortocaval LN 1.8 x 1.1 cm is unchanged, previously referenced Right mesenteric LN measuring 2.5 x 1.2 cm (previously 2.1 x 1.3 cm)  2024 - Bryan reports being in her usual state of health until earlier November when she started having generalized abdominal pain that progressively worsened, eventually focusing mainly in her RLQ. She called her PCP who directed her to urgent care, at  an abd US showed negative findings. Given the severity of her pain, she then reported to [facility] for further workup. She is almost done with a 14-day course of Augmentin (about 3 days left), no longer has any abdominal pain. She denies any nausea/vomiting, diarrhea, appetite & weight are unchanged. She is currently in between PCPs as her previous PCP (Dr. Davis @ [facility]) no longer does internal medicine, has not seen established with a new PCP yet.  We discussed the perforated appendicitis and the concern for a cecal malignancy, and the adnexal mass.  The appendiceal abscess appears to have decreased in size over the past 2 weeks with antibiotics.  The cecal mass persists as does the adnexal lesions.  We discussed the need for ongoing antibiotics for another 2 weeks and repeat imaging including a CT colonography.  We will also arrange for GYN oncology follow-up and GI evaluation as well.  Ultimately, we discussed the need for surgical extirpation of the cecal mass pending the further workup.  CT enterography 2024 - 4.8 cm septated Left adnexal cystic mass & 3.5 cm Right adnexal cystic mass are unchanged, endometrium is mildly fluid distended vs thickened - masslike annular thickening of the cecum w/ avid enhancement is similar to prior study, colonic diverticulosis. appendix remains thickened & abnormal  * resolution of previously noted pelvic fluid collections - RP nodes, some now necrotic. referenced necrotic aortocaval node measures 2.3 x 1.5 cm (previously 1.8 x 1.1 cm) adjacent nonnecrotic node measures 1.6 x 1.1 cm, w/o significant change when remeasured on prior study, RLQ mesenteric heterogenous mass/adenopathy measures 2.7 x 1.9 cm (previously 3.1 x 2.0 cm)  2024 - Bryan is here for a follow-up visit after her recent imaging to discuss next steps. She consulted w/ Dr. Ilia Llanes last week (GI @ [facility]) and he is planning for a baseline preoperative Cscope. She has finished her course of ABT yesterday. She consulted w/ Dr. Yamile Modi from [facility] who does recommend a concomitant BSO at the time of our surgery - Bryan defers. She otherwise is feeling better, noted some right intermittent right flank pain that is mild in nature.  We discussed the plan for a robotic possible open right colectomy including the jazzy-appendiceal abscess. We discussed the risks, benefits and alternatives of the procedure.  We also discussed post operative expectations and possible complications.  Bryan expresses understanding and agrees to proceed.  Bryan will undergo a preoperative colonoscopy too assess the cecal mass.  We also discussed consideration of bilateral salpingo-oophorectomy with GYN oncology. Bryan at this time has not decided if she is willing to proceed with the BSO.  CT A/P 2025 - mass-like annular thickening of the cecum (6.2 x 4.7 cm) w/ avid enhancement, similar to prior study. no bowel obstruction, appendix remains thickened & abnormal - some of the previously noted necrotic RP LN are now more solid in appearance. nevertheless, additional necrotic LN are seen as well, size is similar to prior exam (RLQ mass/adenopathy measures 2.8 x 2 cm) - unchanged B/L adnexal cysts   colonoscopy 2025 (Dr. Ilia Llanes @ [facility]) - likely malignant partially obstruction tumor in cecum - bx - one 5-10 mm polyp in sigmoid, removed - moderate diverticulosis in sigmoid & descending colon **bx of cecum mass showed invasive poorly differentiated adenocarcinoma, no LVI, MMR-P (c/w colon primary)  Surgery was initially delayed r/t insurance issues, then scheduled for  - however, Bryan presented to [facility] on 2/10 w/ worsening abdominal pain. In the ED, she also sustained a fall r/t a vasovagal episode, +HS/LOC.  Imaging showed unchanged cecal mass & adjacent likely metastatic nodes w/ new mild small bowel dilatation to level or cecum - c/f developing obstruction.  **SURGERY** Bryan is now s/p a robotic to open Right hemicolectomy w/ SMA LND & concurrent BSO (Dr. Yamile Modi) on 2025, path: - invasive poorly differentiated adenocarcinoma of cecum, 8.5 cm, small vessel, large vessel & extramural LVI, PNI present, tumor invading serosa extending to mesentery vascular pedicle margin,  LN positive for mets (7 from superior mesenteric), pT4a N2b * small intestine & colon w/ anastomosis & no significant histopathologic findings  **GYN path still pending for BSO   2025 - Bryan is here for an initial post-op visit. She is feeling well overall, ambulating and c/o minimal pain. She is eating well and eager to liberalize her diet.

## 2025-02-28 NOTE — CONSULT LETTER
[Dear  ___] : Dear  [unfilled], [Consult Letter:] : I had the pleasure of evaluating your patient, [unfilled]. [Please see my note below.] : Please see my note below. [Consult Closing:] : Thank you very much for allowing me to participate in the care of this patient.  If you have any questions, please do not hesitate to contact me. [Sincerely,] : Sincerely, [DrRasheeda  ___] : Dr. MARTINEZ [DrRasheeda ___] : Dr. MARTINEZ [FreeTextEntry2] : Jarred Davis MD [FreeTextEntry3] : Bull Montalvo MD Surgical Oncology [facility] Office: [phone] Cell: [phone]

## 2025-02-28 NOTE — PHYSICAL EXAM
[Normal] : supple, no neck mass and thyroid not enlarged [Normal Neck Lymph Nodes] : normal neck lymph nodes  [Normal Supraclavicular Lymph Nodes] : normal supraclavicular lymph nodes [Normal Groin Lymph Nodes] : normal groin lymph nodes [Normal Axillary Lymph Nodes] : normal axillary lymph nodes [Normal] : oriented to person, place and time, with appropriate affect [de-identified] : midline incision healing well

## 2025-03-07 NOTE — REASON FOR VISIT
[Post Op] : post op visit [de-identified] : 2/13/25 [de-identified] : BSO [de-identified] : No vaginal bleeding or malodorous discharge. No fevers/chills.  Incisions without concern. Recovering well.  Saw Dr. Montalvo for postop.

## 2025-03-07 NOTE — ASSESSMENT
[FreeTextEntry1] : Healing well  Discussed ongoing recuperation. Reviewed Final pathology results in detail. A copy was given to the patient. Follow up with Med/Onc and Dr. Montalvo as indicated We reviewed recommended surveillance plan follow up with regular GYN. Patient stated and expressed a good understanding of the above information.

## 2025-03-07 NOTE — DISCUSSION/SUMMARY
[Clean] : was clean [Dry] : was dry [Intact] : was intact [Erythema] : was not erythematous [Ecchymosis] : was not ecchymotic [Seroma] : had no seroma [Sutures Intact] : had sutures  intact [None] : had no drainage [Normal Skin] : normal appearance [Normal Skin Turgor] : normal skin turgor [Firm] : soft [Tender] : nontender [Rebound] : no rebound tenderness [Guarding] : no guarding [Doing Well] : is doing well [Excellent Pain Control] : has excellent pain control [No Sign of Infection] : is showing no signs of infection [de-identified] : deferred no VB [de-identified] : /GI function returning to baseline [de-identified] : postoperative recuperation discussed [FreeTextEntry1] : 1.  Adnexa, right, salpingo-oophorectomy: -   Serous cystadenofibroma, ovary -   Unremarkable fimbriated fallopian tube  2.  Adnexa, left, salpingo-oophorectomy: -   Serous cystadenofibroma, ovary -   Unremarkable fimbriated fallopian tube

## 2025-05-29 NOTE — HISTORY OF PRESENT ILLNESS
[de-identified] : Ms. BRYAN JOVEL is a 70-year-old woman, referred by Dr. 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  Bryan presented to [facility] in 2024 w/ complaints of 1 week of progressively worsening abdominal pain, more severe to her RLQ pain. Subsequent CT showed multiple findings concerning for perforated appendicitis vs neoplastic process. She was ultimately discharged home on 2 weeks of Augmentin followed by a repeat CT to further assess.   CT A/P 2024 - small right & trace left pleural effusions, bibasilar subsegmental atelectasis  - irregular mural thickening & hyperenhancement involving the cecum & terminal ileum - suspicious for neoplasm, which measures approx. 5.5 x 3.8 cm - there is a complex multiloculated cystic structure or collection within the RLQ immediately adjacent to the cecum that measures 5.7 x 5.0 cm w/ components that appear somewhat tubular - possibly reflecting a dilated appendix, otherwise appendix is not clearly delineated - diffuse fat infiltration in the pelvis - wall thickening of the rectosigmoid colon, a segment of which is inseparable from the aforementioned complex cystic structure, colonic diverticulosis   labs 2024 - CEA, CA 19.9,  & LFTs all WNL  CT chest (non con) 2024 - no pulm nodules noted - small B/L pleural effusions, R > L   pelvic US 2024 - B/L ovarian cysts - mild thickening of the endometrial complex, which measures 8 mm - Right adnexal extraovarian mass measuring up to 5.3 cm corresponding w./ findings on recent CT - nonspecific but may represent perforated appendicitis   pelvic MRI 2024 - redemonstrated 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 cm) w/ adjacent LAD - RLQ abscess (7.5 x 6.6 cm) likely due to perforated appendix, focal thickening of the sigmoid colon abutting the collection - multi cystic appearance of the ovaries   PMH: denies    PSH: laparoscopic sx  for endometriosis  Meds:  denies ALL:  NKDA, seasonal  SH:  denies tobacco use, social ETOH, lives at home w/ her  (Víctor), 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 FH: mother w/ cervical (40's) and lung cancer (70's, former smoker), father w/ prostate & lung (smoker) GYN: Menarche 12. Menopause 53.  ( births, ). Age of first full-term pregnancy 18. Denies OCP/HRT use. NO prior Cscope  CT A/P 2024 - unchanged sub cm hypodense hepatic lesions that are too small to characterize - few sub cm hypodense left renal lesions that are too small to characterize, Right kidney slightly rotated on its axis as on prior imaging  - B/L multiseptated cystic adnexal masses are again noted, measuring 3.7 x 2.4 cm on the Right and 5.1 x 2.8 cm on the Left - minimally smaller but not significantly changed respectively  - small duodenal diverticulum, large amt of stool throughout colon - irregular annular thickening & hyperenhancement of cecal bowel wall, most c/w a cecal mass - interval decrease in size of a multi loculated collection in the RLQ encompassing a thickened & abnormal appearing appendix  - a more superior component of the collection measures approx. 2.3 x 1.7 cm (previously 4.5 x 3.7 cm) and a more inferior component measures approx. 4.2 x 3.6 cm (previously 6.9 x 5.4 cm) - partially necrotic RLQ soft tissue/LAD w/ a referenced LN measuring 3.1 x 2.0 cm - an aortocaval LN measures 1.5 x 1.8 cm, unchanged, additional aortocaval LN 1.8 x 1.1 cm is unchanged, previously referenced Right mesenteric LN measuring 2.5 x 1.2 cm (previously 2.1 x 1.3 cm)  2024 - Bryan reports being in her usual state of health until earlier November when she started having generalized abdominal pain that progressively worsened, eventually focusing mainly in her RLQ. She called her PCP who directed her to urgent care, at  an abd US showed negative findings. Given the severity of her pain, she then reported to [facility] for further workup. She is almost done with a 14-day course of Augmentin (about 3 days left), no longer has any abdominal pain. She denies any nausea/vomiting, diarrhea, appetite & weight are unchanged. She is currently in between PCPs as her previous PCP (Dr. Davis @ [facility]) no longer does internal medicine, has not seen established with a new PCP yet.  We discussed the perforated appendicitis and the concern for a cecal malignancy, and the adnexal mass.  The appendiceal abscess appears to have decreased in size over the past 2 weeks with antibiotics.  The cecal mass persists as does the adnexal lesions.  We discussed the need for ongoing antibiotics for another 2 weeks and repeat imaging including a CT colonography.  We will also arrange for GYN oncology follow-up and GI evaluation as well.  Ultimately, we discussed the need for surgical extirpation of the cecal mass pending the further workup.  CT enterography 2024 - 4.8 cm septated Left adnexal cystic mass & 3.5 cm Right adnexal cystic mass are unchanged, endometrium is mildly fluid distended vs thickened - masslike annular thickening of the cecum w/ avid enhancement is similar to prior study, colonic diverticulosis. appendix remains thickened & abnormal  * resolution of previously noted pelvic fluid collections - RP nodes, some now necrotic. referenced necrotic aortocaval node measures 2.3 x 1.5 cm (previously 1.8 x 1.1 cm) adjacent nonnecrotic node measures 1.6 x 1.1 cm, w/o significant change when remeasured on prior study, RLQ mesenteric heterogenous mass/adenopathy measures 2.7 x 1.9 cm (previously 3.1 x 2.0 cm)  2024 - Bryan is here for a follow-up visit after her recent imaging to discuss next steps. She consulted w/ Dr. Ilia Llanes last week (GI @ [facility]) and he is planning for a baseline preoperative Cscope. She has finished her course of ABT yesterday. She consulted w/ Dr. Yamile Modi from [facility] who does recommend a concomitant BSO at the time of our surgery - Bryan defers. She otherwise is feeling better, noted some right intermittent right flank pain that is mild in nature.  We discussed the plan for a robotic possible open right colectomy including the jazzy-appendiceal abscess. We discussed the risks, benefits and alternatives of the procedure.  We also discussed post operative expectations and possible complications.  Bryan expresses understanding and agrees to proceed.  Bryan will undergo a preoperative colonoscopy too assess the cecal mass.  We also discussed consideration of bilateral salpingo-oophorectomy with GYN oncology. Bryan at this time has not decided if she is willing to proceed with the BSO.  CT A/P 2025 - mass-like annular thickening of the cecum (6.2 x 4.7 cm) w/ avid enhancement, similar to prior study. no bowel obstruction, appendix remains thickened & abnormal - some of the previously noted necrotic RP LN are now more solid in appearance. nevertheless, additional necrotic LN are seen as well, size is similar to prior exam (RLQ mass/adenopathy measures 2.8 x 2 cm) - unchanged B/L adnexal cysts   colonoscopy 2025 (Dr. Ilia Llanes @ [facility]) - likely malignant partially obstruction tumor in cecum - bx - one 5-10 mm polyp in sigmoid, removed - moderate diverticulosis in sigmoid & descending colon **bx of cecum mass showed invasive poorly differentiated adenocarcinoma, no LVI, MMR-P (c/w colon primary)  Surgery was initially delayed r/t insurance issues, then scheduled for  - however, Bryan presented to [facility] on 2/10 w/ worsening abdominal pain. In the ED, she also sustained a fall r/t a vasovagal episode, +HS/LOC.  Imaging showed unchanged cecal mass & adjacent likely metastatic nodes w/ new mild small bowel dilatation to level or cecum - c/f developing obstruction.  **SURGERY** Bryan is now s/p a robotic to open Right hemicolectomy w/ SMA LND & concurrent BSO (Dr. Yamile Modi) on 2025, path: - invasive poorly differentiated adenocarcinoma of cecum, 8.5 cm, small vessel, large vessel & extramural LVI, PNI present, tumor invading serosa extending to mesentery vascular pedicle margin,  LN positive for mets (7 from superior mesenteric), pT4a N2b, MMR-P * small intestine & colon w/ anastomosis & no significant histopathologic findings  * BSO path unremarkable (serous cyst adenofibromas)   2025 - Bryan is here for an initial post-op visit. She is feeling well overall, ambulating and c/o minimal pain. She is eating well and eager to liberalize her diet. We discussed the need for medical oncology evaluation.  Bryan will liberalize her diet and slowly return to baseline activities.    CT C/A/P 2025 - s/p partial right hemicolectomy, no recurrent mass in RLQ - significant interval increase w/ new moderately extensive low-density RP, aortocaval & para-aortic LAD extending to the left common iliac region. largest RP LN measures 1.9 cm on the right and 1.2 cm in left common iliac region - previously seen B/L adnexal masses no longer seen  2025 - Bryan is here for a follow-up visit, accompanied by her , to discuss recent imaging and next steps. After our last visit, she consulted w/ Dr. Jay Perez from medical oncology who recommended adjuvant therapy which she ultimately declined. She opted instead for alternative therapy w/ [place] therapy & high dose vitamin C treatments. She notes her appetite has been stable however, she has had difficulty gaining weight (this was not an issue in the past). She has some lower abdominal 'inflammation' that is intermittent, sometimes relieved by Motrin. She saw Dr. Perez again earlier this week after her recent scans to discuss the POD, he again recommends adjuvant therapy, Bryan is not in favor of chemotherapy at this point.

## 2025-05-29 NOTE — PHYSICAL EXAM
[Normal] : supple, no neck mass and thyroid not enlarged [Normal Neck Lymph Nodes] : normal neck lymph nodes  [Normal Supraclavicular Lymph Nodes] : normal supraclavicular lymph nodes [Normal Groin Lymph Nodes] : normal groin lymph nodes [Normal Axillary Lymph Nodes] : normal axillary lymph nodes [Normal] : oriented to person, place and time, with appropriate affect [de-identified] : midline incision healing well

## 2025-05-29 NOTE — CONSULT LETTER
[Dear  ___] : Dear  [unfilled], [Consult Letter:] : I had the pleasure of evaluating your patient, [unfilled]. [Please see my note below.] : Please see my note below. [Consult Closing:] : Thank you very much for allowing me to participate in the care of this patient.  If you have any questions, please do not hesitate to contact me. [Sincerely,] : Sincerely, [DrRasheeda  ___] : Dr. MARTINEZ [DrRasheeda ___] : Dr. MARTINEZ [FreeTextEntry2] : Jarred Davis MD [FreeTextEntry3] : Bull Montalvo MD Surgical Oncology [facility] Office: [phone] Cell: [phone]

## 2025-05-29 NOTE — PHYSICAL EXAM
[Normal] : supple, no neck mass and thyroid not enlarged [Normal Neck Lymph Nodes] : normal neck lymph nodes  [Normal Supraclavicular Lymph Nodes] : normal supraclavicular lymph nodes [Normal Groin Lymph Nodes] : normal groin lymph nodes [Normal Axillary Lymph Nodes] : normal axillary lymph nodes [Normal] : oriented to person, place and time, with appropriate affect [de-identified] : midline incision healing well

## 2025-05-29 NOTE — REASON FOR VISIT
[Follow-Up Visit] : a follow-up visit for [Colon Cancer] : colon cancer [FreeTextEntry2] : cecal mass & perforated appendicitis

## 2025-05-29 NOTE — HISTORY OF PRESENT ILLNESS
[de-identified] : Ms. BRYAN JOVEL is a 70-year-old woman, referred by Dr. 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  Bryan presented to [facility] in 2024 w/ complaints of 1 week of progressively worsening abdominal pain, more severe to her RLQ pain. Subsequent CT showed multiple findings concerning for perforated appendicitis vs neoplastic process. She was ultimately discharged home on 2 weeks of Augmentin followed by a repeat CT to further assess.   CT A/P 2024 - small right & trace left pleural effusions, bibasilar subsegmental atelectasis  - irregular mural thickening & hyperenhancement involving the cecum & terminal ileum - suspicious for neoplasm, which measures approx. 5.5 x 3.8 cm - there is a complex multiloculated cystic structure or collection within the RLQ immediately adjacent to the cecum that measures 5.7 x 5.0 cm w/ components that appear somewhat tubular - possibly reflecting a dilated appendix, otherwise appendix is not clearly delineated - diffuse fat infiltration in the pelvis - wall thickening of the rectosigmoid colon, a segment of which is inseparable from the aforementioned complex cystic structure, colonic diverticulosis   labs 2024 - CEA, CA 19.9,  & LFTs all WNL  CT chest (non con) 2024 - no pulm nodules noted - small B/L pleural effusions, R > L   pelvic US 2024 - B/L ovarian cysts - mild thickening of the endometrial complex, which measures 8 mm - Right adnexal extraovarian mass measuring up to 5.3 cm corresponding w./ findings on recent CT - nonspecific but may represent perforated appendicitis   pelvic MRI 2024 - redemonstrated 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 cm) w/ adjacent LAD - RLQ abscess (7.5 x 6.6 cm) likely due to perforated appendix, focal thickening of the sigmoid colon abutting the collection - multi cystic appearance of the ovaries   PMH: denies    PSH: laparoscopic sx  for endometriosis  Meds:  denies ALL:  NKDA, seasonal  SH:  denies tobacco use, social ETOH, lives at home w/ her  (Víctor), 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 FH: mother w/ cervical (40's) and lung cancer (70's, former smoker), father w/ prostate & lung (smoker) GYN: Menarche 12. Menopause 53.  ( births, ). Age of first full-term pregnancy 18. Denies OCP/HRT use. NO prior Cscope  CT A/P 2024 - unchanged sub cm hypodense hepatic lesions that are too small to characterize - few sub cm hypodense left renal lesions that are too small to characterize, Right kidney slightly rotated on its axis as on prior imaging  - B/L multiseptated cystic adnexal masses are again noted, measuring 3.7 x 2.4 cm on the Right and 5.1 x 2.8 cm on the Left - minimally smaller but not significantly changed respectively  - small duodenal diverticulum, large amt of stool throughout colon - irregular annular thickening & hyperenhancement of cecal bowel wall, most c/w a cecal mass - interval decrease in size of a multi loculated collection in the RLQ encompassing a thickened & abnormal appearing appendix  - a more superior component of the collection measures approx. 2.3 x 1.7 cm (previously 4.5 x 3.7 cm) and a more inferior component measures approx. 4.2 x 3.6 cm (previously 6.9 x 5.4 cm) - partially necrotic RLQ soft tissue/LAD w/ a referenced LN measuring 3.1 x 2.0 cm - an aortocaval LN measures 1.5 x 1.8 cm, unchanged, additional aortocaval LN 1.8 x 1.1 cm is unchanged, previously referenced Right mesenteric LN measuring 2.5 x 1.2 cm (previously 2.1 x 1.3 cm)  2024 - Bryan reports being in her usual state of health until earlier November when she started having generalized abdominal pain that progressively worsened, eventually focusing mainly in her RLQ. She called her PCP who directed her to urgent care, at  an abd US showed negative findings. Given the severity of her pain, she then reported to [facility] for further workup. She is almost done with a 14-day course of Augmentin (about 3 days left), no longer has any abdominal pain. She denies any nausea/vomiting, diarrhea, appetite & weight are unchanged. She is currently in between PCPs as her previous PCP (Dr. Davis @ [facility]) no longer does internal medicine, has not seen established with a new PCP yet.  We discussed the perforated appendicitis and the concern for a cecal malignancy, and the adnexal mass.  The appendiceal abscess appears to have decreased in size over the past 2 weeks with antibiotics.  The cecal mass persists as does the adnexal lesions.  We discussed the need for ongoing antibiotics for another 2 weeks and repeat imaging including a CT colonography.  We will also arrange for GYN oncology follow-up and GI evaluation as well.  Ultimately, we discussed the need for surgical extirpation of the cecal mass pending the further workup.  CT enterography 2024 - 4.8 cm septated Left adnexal cystic mass & 3.5 cm Right adnexal cystic mass are unchanged, endometrium is mildly fluid distended vs thickened - masslike annular thickening of the cecum w/ avid enhancement is similar to prior study, colonic diverticulosis. appendix remains thickened & abnormal  * resolution of previously noted pelvic fluid collections - RP nodes, some now necrotic. referenced necrotic aortocaval node measures 2.3 x 1.5 cm (previously 1.8 x 1.1 cm) adjacent nonnecrotic node measures 1.6 x 1.1 cm, w/o significant change when remeasured on prior study, RLQ mesenteric heterogenous mass/adenopathy measures 2.7 x 1.9 cm (previously 3.1 x 2.0 cm)  2024 - Bryan is here for a follow-up visit after her recent imaging to discuss next steps. She consulted w/ Dr. Ilia Llanes last week (GI @ [facility]) and he is planning for a baseline preoperative Cscope. She has finished her course of ABT yesterday. She consulted w/ Dr. Yamile Modi from [facility] who does recommend a concomitant BSO at the time of our surgery - Bryan defers. She otherwise is feeling better, noted some right intermittent right flank pain that is mild in nature.  We discussed the plan for a robotic possible open right colectomy including the jazzy-appendiceal abscess. We discussed the risks, benefits and alternatives of the procedure.  We also discussed post operative expectations and possible complications.  Bryan expresses understanding and agrees to proceed.  Bryan will undergo a preoperative colonoscopy too assess the cecal mass.  We also discussed consideration of bilateral salpingo-oophorectomy with GYN oncology. Bryan at this time has not decided if she is willing to proceed with the BSO.  CT A/P 2025 - mass-like annular thickening of the cecum (6.2 x 4.7 cm) w/ avid enhancement, similar to prior study. no bowel obstruction, appendix remains thickened & abnormal - some of the previously noted necrotic RP LN are now more solid in appearance. nevertheless, additional necrotic LN are seen as well, size is similar to prior exam (RLQ mass/adenopathy measures 2.8 x 2 cm) - unchanged B/L adnexal cysts   colonoscopy 2025 (Dr. Ilia Llanes @ [facility]) - likely malignant partially obstruction tumor in cecum - bx - one 5-10 mm polyp in sigmoid, removed - moderate diverticulosis in sigmoid & descending colon **bx of cecum mass showed invasive poorly differentiated adenocarcinoma, no LVI, MMR-P (c/w colon primary)  Surgery was initially delayed r/t insurance issues, then scheduled for  - however, Bryan presented to [facility] on 2/10 w/ worsening abdominal pain. In the ED, she also sustained a fall r/t a vasovagal episode, +HS/LOC.  Imaging showed unchanged cecal mass & adjacent likely metastatic nodes w/ new mild small bowel dilatation to level or cecum - c/f developing obstruction.  **SURGERY** Bryan is now s/p a robotic to open Right hemicolectomy w/ SMA LND & concurrent BSO (Dr. Yamile Modi) on 2025, path: - invasive poorly differentiated adenocarcinoma of cecum, 8.5 cm, small vessel, large vessel & extramural LVI, PNI present, tumor invading serosa extending to mesentery vascular pedicle margin,  LN positive for mets (7 from superior mesenteric), pT4a N2b, MMR-P * small intestine & colon w/ anastomosis & no significant histopathologic findings  * BSO path unremarkable (serous cyst adenofibromas)   2025 - Bryan is here for an initial post-op visit. She is feeling well overall, ambulating and c/o minimal pain. She is eating well and eager to liberalize her diet. We discussed the need for medical oncology evaluation.  Bryan will liberalize her diet and slowly return to baseline activities.    CT C/A/P 2025 - s/p partial right hemicolectomy, no recurrent mass in RLQ - significant interval increase w/ new moderately extensive low-density RP, aortocaval & para-aortic LAD extending to the left common iliac region. largest RP LN measures 1.9 cm on the right and 1.2 cm in left common iliac region - previously seen B/L adnexal masses no longer seen  2025 - Bryan is here for a follow-up visit, accompanied by her , to discuss recent imaging and next steps. After our last visit, she consulted w/ Dr. Jay Perez from medical oncology who recommended adjuvant therapy which she ultimately declined. She opted instead for alternative therapy w/ [place] therapy & high dose vitamin C treatments. She notes her appetite has been stable however, she has had difficulty gaining weight (this was not an issue in the past). She has some lower abdominal 'inflammation' that is intermittent, sometimes relieved by Motrin. She saw Dr. Perez again earlier this week after her recent scans to discuss the POD, he again recommends adjuvant therapy, Bryan is not in favor of chemotherapy at this point.

## 2025-05-29 NOTE — CONSULT LETTER
[Dear  ___] : Dear  [unfilled], [Consult Letter:] : I had the pleasure of evaluating your patient, [unfilled]. [Please see my note below.] : Please see my note below. [Consult Closing:] : Thank you very much for allowing me to participate in the care of this patient.  If you have any questions, please do not hesitate to contact me. [Sincerely,] : Sincerely, [DrRasheeda  ___] : Dr. MARTINEZ [DrRasheeda ___] : Dr. MARTINEZ [FreeTextEntry2] : Jarred Davis MD [FreeTextEntry3] : Bull Montalvo MD Surgical Oncology [facility] Office: [phone] Cell: [phone]

## 2025-05-29 NOTE — ASSESSMENT
[FreeTextEntry1] :  We had a lengthy discussion and reviewed Jessica's most recent imaging and the concern for disease progression.  We discussed options for systemic therapy.  Jessica still feels hesitant to consider systemic therapy.  We discussed the role of palliative care options as well.  We will discuss next week her decisions and goals of care.  All medical entries were at my, Dr. Bull Montalvo, direction. I have reviewed the chart and agree that the record accurately reflects my personal performance of the history, physical exam, assessment, and plan.  Our office nurse practitioner was present for the duration of the office visit.

## 2025-06-19 NOTE — DATA REVIEWED
[FreeTextEntry1] : CT CAP (5/23/2025)  FINDINGS: CHEST: LUNGS AND LARGE AIRWAYS: Patent central airways. No pulmonary nodules. Hyperinflation suggesting underlying COPD. PLEURA: No pleural effusion. VESSELS: Mild coronary artery calcification. HEART: Heart size is normal. No pericardial effusion. MEDIASTINUM AND HERMAN: No lymphadenopathy. CHEST WALL AND LOWER NECK: Within normal limits.  ABDOMEN AND PELVIS: LIVER: Stable subcentimeter cyst left lobe liver axial image [id number]. No other space-occupying disease liver. BILE DUCTS: Normal caliber. GALLBLADDER: Within normal limits. SPLEEN: Within normal limits. PANCREAS: Within normal limits. ADRENALS: Within normal limits. KIDNEYS/URETERS: Stable small left renal cyst and additional hypodensity too small to characterize.  BLADDER: Within normal limits. REPRODUCTIVE ORGANS: Normal uterus. Previous identified bilateral cystic adnexal masses no longer seen.  There is a moderate amount of stool throughout the colon.  Interval partial right hemicolectomy since February 10, 2025. No recurrent mass within the right lower quadrant.  BOWEL: No bowel obstruction. Appendix is absent. PERITONEUM: Within normal limits. VESSELS: Within normal limits. LYMPH NODES/RETROPERITONEUM: Significant interval increase with new moderately extensive extensive low density retroperitoneal, aortocaval and para-aortic lymphadenopathy standing to the left common iliac region. Largest retroperitoneal lymph node measures 1.9 x 1.4 cm on the right and the left common iliac region 1.2 x 2.6 cm. ABDOMINAL WALL: Within normal limits. BONES: Degenerative changes. No lytic or blastic process.  IMPRESSION:  No metastatic disease within the chest.  Interval partial right hemicolectomy since February 10, 2025. No recurrent mass within the right lower quadrant.  Significant interval increase with new moderately extensive extensive low density retroperitoneal, aortocaval and para-aortic lymphadenopathy extending to the left common iliac region.  Previous identified bilateral cystic adnexal masses no longer seen.  Please refer to detailed findings otherwise described above.  --- End of Report ---

## 2025-06-19 NOTE — ASSESSMENT
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 Now with disease progression on CT imaging. Pt would prefer to avoid systemic therapy given her concerns of being able to tolerate it.   # Pain 2/2 Neoplasm - Start Morphine liquid, low dose PRN severe pain.  Can use tylenol PRN moderate pain.  # Depression - Pt to restart Mirtazapine 15mg QHS  #  Encounter for palliative care- emotional support provided. Explained the role of palliative care in enhancing quality of life in the setting of serious illness. Her HCP would be her , will complete form on follow up.    Follow up in one month, call sooner with questions or issues.

## 2025-06-19 NOTE — HISTORY OF PRESENT ILLNESS
[FreeTextEntry1] : 70yoF with cecal adenocarcinoma presents for initial palliative care visit, referred by Dr. Montalvo.  PMH significant for depression (previously on mirtazapine about 4 years ago)  Patient is s/p partial colectomy & BSO on 2025 for cecal cancer, here for a follow-up visit.  Patient presented to [facility] in 2024 w/ complaints of 1 week of progressively worsening abdominal pain, more severe to her RLQ pain. Subsequent CT showed multiple findings concerning for perforated appendicitis vs neoplastic process. She was ultimately discharged home on 2 weeks of Augmentin followed by a repeat CT to further assess. She is s/p robotic to open Right hemicolectomy w/ SMA LND & concurrent BSO (Dr. Yamile Modi) on 2025, path: invasive poorly differentiated adenocarcinoma of cecum, 8.5 cm, small vessel, large vessel & extramural LVI, PNI present, tumor invading serosa extending to mesentery vascular pedicle margin,  LN positive for mets (7 from superior mesenteric), pT4a N2b, MMR-P  Patient met with medical oncologist Dr. Jay Perez at [facility] who recommended adjuvant therapy which she ultimately declined, patient is concerned about being able to tolerate chemotherapy. Has been following with an alternative medicine doctor in [place] and started treatments that included ozone therapy and high dose Vitamin C. She also uses vitamin supplementation and turmeric capsules.   She experiences pain that she refers to as "inflammation" in her low abdomen.  The pain is present every day, she describes it as a "burning" pain, similar to the burn that is felt after you slap yourself.   Was advised by Dr. Perez to take Motrin 400mg which she began doing about a month ago, and she has been using it a couple of times a day.   ROS: +diarrhea - had some episodes last night, not typical for her. She has been having erratic BM schedule since surgery.  She has days where she has multiple soft BMs in a day, other days she won't have any BM.  +dozes during the day, short spurts +nocturia - typically can fall back asleep OK +physical weakness +mood has been low since the cancer diagnosis - was on mirtazapine years ago, was recently put back on but she held it as she was concerned about whether it was causing bowel issues. The bowel issues persisted despite stopping the mirtazapine. +keeps to healthy diet, has been trying to gain back the weight she lost Denies n/v,   Patient is , lives with her , Víctor.  They have an adult son. She is a retired  for [employer] at [facility].   PCP: Dr. Ebony Deutsch ([facility]) Complementary and Alternative Medicine: Dr. Vielka Molina  I-STOP Ref#: [id number]

## 2025-06-19 NOTE — PHYSICAL EXAM
[General Appearance - Alert] : alert [General Appearance - In No Acute Distress] : in no acute distress [Sclera] : the sclera and conjunctiva were normal [Normal Oral Mucosa] : normal oral mucosa [Neck Appearance] : the appearance of the neck was normal [] : no respiratory distress [Auscultation Breath Sounds / Voice Sounds] : lungs were clear to auscultation bilaterally [Heart Rate And Rhythm] : heart rate was normal and rhythm regular [Heart Sounds] : normal S1 and S2 [Edema] : there was no peripheral edema [Bowel Sounds] : normal bowel sounds [Abdomen Soft] : soft [Abnormal Walk] : normal gait [Skin Color & Pigmentation] : normal skin color and pigmentation [No Focal Deficits] : no focal deficits [Oriented To Time, Place, And Person] : oriented to person, place, and time [Affect] : the affect was normal [FreeTextEntry1] : +no TTP, lower abdominal bloating noted

## 2025-06-19 NOTE — HISTORY OF PRESENT ILLNESS
[FreeTextEntry1] : 70yoF with cecal adenocarcinoma presents for initial palliative care visit, referred by Dr. Montalvo.  PMH significant for depression (previously on mirtazapine about 4 years ago)  Patient is s/p partial colectomy & BSO on 2025 for cecal cancer, here for a follow-up visit.  Patient presented to [facility] in 2024 w/ complaints of 1 week of progressively worsening abdominal pain, more severe to her RLQ pain. Subsequent CT showed multiple findings concerning for perforated appendicitis vs neoplastic process. She was ultimately discharged home on 2 weeks of Augmentin followed by a repeat CT to further assess. She is s/p robotic to open Right hemicolectomy w/ SMA LND & concurrent BSO (Dr. Yamile Modi) on 2025, path: invasive poorly differentiated adenocarcinoma of cecum, 8.5 cm, small vessel, large vessel & extramural LVI, PNI present, tumor invading serosa extending to mesentery vascular pedicle margin,  LN positive for mets (7 from superior mesenteric), pT4a N2b, MMR-P  Patient met with medical oncologist Dr. Jay Perez at [facility] who recommended adjuvant therapy which she ultimately declined, patient is concerned about being able to tolerate chemotherapy. Has been following with an alternative medicine doctor in [place] and started treatments that included ozone therapy and high dose Vitamin C. She also uses vitamin supplementation and turmeric capsules.   She experiences pain that she refers to as "inflammation" in her low abdomen.  The pain is present every day, she describes it as a "burning" pain, similar to the burn that is felt after you slap yourself.   Was advised by Dr. Perez to take Motrin 400mg which she began doing about a month ago, and she has been using it a couple of times a day.   ROS: +diarrhea - had some episodes last night, not typical for her. She has been having erratic BM schedule since surgery.  She has days where she has multiple soft BMs in a day, other days she won't have any BM.  +dozes during the day, short spurts +nocturia - typically can fall back asleep OK +physical weakness +mood has been low since the cancer diagnosis - was on mirtazapine years ago, was recently put back on but she held it as she was concerned about whether it was causing bowel issues. The bowel issues persisted despite stopping the mirtazapine. +keeps to healthy diet, has been trying to gain back the weight she lost Denies n/v,   Patient is , lives with her , Víctor.  They have an adult son. She is a retired  for [employer] at [facility] [employer].   PCP: Dr. Ebony Deutsch ([facility]) Complementary and Alternative Medicine: Dr. Vielka Molina  I-STOP Ref#: [id number]

## 2025-06-19 NOTE — HISTORY OF PRESENT ILLNESS
[FreeTextEntry1] : 70yoF with cecal adenocarcinoma presents for initial palliative care visit, referred by Dr. Montalvo.  PMH significant for depression (previously on mirtazapine about 4 years ago)  Patient is s/p partial colectomy & BSO on 2025 for cecal cancer, here for a follow-up visit.  Patient presented to [facility] in 2024 w/ complaints of 1 week of progressively worsening abdominal pain, more severe to her RLQ pain. Subsequent CT showed multiple findings concerning for perforated appendicitis vs neoplastic process. She was ultimately discharged home on 2 weeks of Augmentin followed by a repeat CT to further assess. She is s/p robotic to open Right hemicolectomy w/ SMA LND & concurrent BSO (Dr. Yamile Modi) on 2025, path: invasive poorly differentiated adenocarcinoma of cecum, 8.5 cm, small vessel, large vessel & extramural LVI, PNI present, tumor invading serosa extending to mesentery vascular pedicle margin,  LN positive for mets (7 from superior mesenteric), pT4a N2b, MMR-P  Patient met with medical oncologist Dr. Jay Perez at [facility] who recommended adjuvant therapy which she ultimately declined, patient is concerned about being able to tolerate chemotherapy. Has been following with an alternative medicine doctor in [place] and started treatments that included ozone therapy and high dose Vitamin C. She also uses vitamin supplementation and turmeric capsules.   She experiences pain that she refers to as "inflammation" in her low abdomen.  The pain is present every day, she describes it as a "burning" pain, similar to the burn that is felt after you slap yourself.   Was advised by Dr. Perez to take Motrin 400mg which she began doing about a month ago, and she has been using it a couple of times a day.   ROS: +diarrhea - had some episodes last night, not typical for her. She has been having erratic BM schedule since surgery.  She has days where she has multiple soft BMs in a day, other days she won't have any BM.  +dozes during the day, short spurts +nocturia - typically can fall back asleep OK +physical weakness +mood has been low since the cancer diagnosis - was on mirtazapine years ago, was recently put back on but she held it as she was concerned about whether it was causing bowel issues. The bowel issues persisted despite stopping the mirtazapine. +keeps to healthy diet, has been trying to gain back the weight she lost Denies n/v,   Patient is , lives with her , Víctor.  They have an adult son. She is a retired  for [employer] hearing [employer] at [employer].   PCP: Dr. Ebony Deutsch ([facility]) Complementary and Alternative Medicine: Dr. Vielka Molina  I-STOP Ref#: [id number]